# Patient Record
Sex: FEMALE | Race: OTHER | Employment: UNEMPLOYED | URBAN - NONMETROPOLITAN AREA
[De-identification: names, ages, dates, MRNs, and addresses within clinical notes are randomized per-mention and may not be internally consistent; named-entity substitution may affect disease eponyms.]

---

## 2021-11-10 ENCOUNTER — APPOINTMENT (OUTPATIENT)
Dept: ULTRASOUND IMAGING | Age: 46
End: 2021-11-10

## 2021-11-10 ENCOUNTER — HOSPITAL ENCOUNTER (EMERGENCY)
Age: 46
Discharge: HOME | End: 2021-11-10

## 2021-11-10 VITALS
DIASTOLIC BLOOD PRESSURE: 68 MMHG | OXYGEN SATURATION: 98 % | TEMPERATURE: 98.7 F | RESPIRATION RATE: 16 BRPM | SYSTOLIC BLOOD PRESSURE: 122 MMHG | HEART RATE: 62 BPM

## 2021-11-10 DIAGNOSIS — N93.8 DUB (DYSFUNCTIONAL UTERINE BLEEDING): Primary | ICD-10-CM

## 2021-11-10 DIAGNOSIS — D25.9 UTERINE LEIOMYOMA, UNSPECIFIED LOCATION: ICD-10-CM

## 2021-11-10 LAB
ALBUMIN SERPL-MCNC: 3.9 G/DL (ref 3.5–5.1)
ALP BLD-CCNC: 48 U/L (ref 38–126)
ALT SERPL-CCNC: 24 U/L (ref 11–66)
ANION GAP SERPL CALCULATED.3IONS-SCNC: 13 MEQ/L (ref 8–16)
AST SERPL-CCNC: 24 U/L (ref 5–40)
BASOPHILS # BLD: 0.9 %
BASOPHILS ABSOLUTE: 0.1 THOU/MM3 (ref 0–0.1)
BILIRUB SERPL-MCNC: < 0.2 MG/DL (ref 0.3–1.2)
BUN BLDV-MCNC: 11 MG/DL (ref 7–22)
CALCIUM SERPL-MCNC: 8.7 MG/DL (ref 8.5–10.5)
CHLORIDE BLD-SCNC: 105 MEQ/L (ref 98–111)
CO2: 22 MEQ/L (ref 23–33)
CREAT SERPL-MCNC: 0.8 MG/DL (ref 0.4–1.2)
EOSINOPHIL # BLD: 2.9 %
EOSINOPHILS ABSOLUTE: 0.2 THOU/MM3 (ref 0–0.4)
ERYTHROCYTE [DISTWIDTH] IN BLOOD BY AUTOMATED COUNT: 16.2 % (ref 11.5–14.5)
ERYTHROCYTE [DISTWIDTH] IN BLOOD BY AUTOMATED COUNT: 51.7 FL (ref 35–45)
GFR SERPL CREATININE-BSD FRML MDRD: 78 ML/MIN/1.73M2
GLUCOSE BLD-MCNC: 119 MG/DL (ref 70–108)
HCT VFR BLD CALC: 28.1 % (ref 37–47)
HEMOGLOBIN: 9.2 GM/DL (ref 12–16)
IMMATURE GRANS (ABS): 0.01 THOU/MM3 (ref 0–0.07)
IMMATURE GRANULOCYTES: 0.2 %
LYMPHOCYTES # BLD: 40.3 %
LYMPHOCYTES ABSOLUTE: 2.4 THOU/MM3 (ref 1–4.8)
MCH RBC QN AUTO: 28.6 PG (ref 26–33)
MCHC RBC AUTO-ENTMCNC: 32.7 GM/DL (ref 32.2–35.5)
MCV RBC AUTO: 87.3 FL (ref 81–99)
MONOCYTES # BLD: 9.4 %
MONOCYTES ABSOLUTE: 0.6 THOU/MM3 (ref 0.4–1.3)
NUCLEATED RED BLOOD CELLS: 0 /100 WBC
OSMOLALITY CALCULATION: 279.9 MOSMOL/KG (ref 275–300)
PLATELET # BLD: 413 THOU/MM3 (ref 130–400)
PMV BLD AUTO: 9.3 FL (ref 9.4–12.4)
POTASSIUM SERPL-SCNC: 3.6 MEQ/L (ref 3.5–5.2)
PREGNANCY, SERUM: NEGATIVE
RBC # BLD: 3.22 MILL/MM3 (ref 4.2–5.4)
SEG NEUTROPHILS: 46.3 %
SEGMENTED NEUTROPHILS ABSOLUTE COUNT: 2.7 THOU/MM3 (ref 1.8–7.7)
SODIUM BLD-SCNC: 140 MEQ/L (ref 135–145)
TOTAL PROTEIN: 7 G/DL (ref 6.1–8)
WBC # BLD: 5.9 THOU/MM3 (ref 4.8–10.8)

## 2021-11-10 PROCEDURE — 85025 COMPLETE CBC W/AUTO DIFF WBC: CPT

## 2021-11-10 PROCEDURE — 36415 COLL VENOUS BLD VENIPUNCTURE: CPT

## 2021-11-10 PROCEDURE — 84703 CHORIONIC GONADOTROPIN ASSAY: CPT

## 2021-11-10 PROCEDURE — 99284 EMERGENCY DEPT VISIT MOD MDM: CPT

## 2021-11-10 PROCEDURE — 76830 TRANSVAGINAL US NON-OB: CPT

## 2021-11-10 PROCEDURE — 80053 COMPREHEN METABOLIC PANEL: CPT

## 2021-11-10 RX ORDER — FERROUS SULFATE 325(65) MG
325 TABLET ORAL
Qty: 60 TABLET | Refills: 0 | Status: SHIPPED | OUTPATIENT
Start: 2021-11-10

## 2021-11-10 RX ORDER — MEDROXYPROGESTERONE ACETATE 10 MG/1
10 TABLET ORAL 2 TIMES DAILY
Qty: 28 TABLET | Refills: 0 | Status: SHIPPED | OUTPATIENT
Start: 2021-11-10 | End: 2021-11-24

## 2021-11-10 ASSESSMENT — ENCOUNTER SYMPTOMS
DIARRHEA: 0
CHEST TIGHTNESS: 0
ABDOMINAL PAIN: 0
SHORTNESS OF BREATH: 0
ABDOMINAL DISTENTION: 0
COLOR CHANGE: 0
VOMITING: 0
NAUSEA: 0

## 2021-11-10 NOTE — ED NOTES
Patient to ER. Patient has had her period for the last five days and it has been extremely heavy. Patient states she has been passing clots and has been feeling weak. Patient states she is changing her pads more frequently than normal. Patient speaks Bulgarian,  at bedside. Assessment complete. Family at bedside, call light in reach.       Minesh Campos  11/10/21 7893

## 2021-11-10 NOTE — ED NOTES
Pt resting in bed. Resp regular. Denies needs.  US in room to take pt for exam.      Viraj Andres RN  11/10/21 0571

## 2021-11-11 NOTE — ED NOTES
Bedside Shift report received from Veena Proctor, [facility]. Pt resting comfortable in bed.  Pt is alert and oriented, respirations are equal and unlabored, pt denies needs at this time, will continue to monitor        Heaven Winchester RN  11/10/21 6744

## 2021-11-11 NOTE — ED PROVIDER NOTES
[facility] Emergency Department    CHIEF COMPLAINT       Chief Complaint   Patient presents with    Vaginal Bleeding       Nurses Notes reviewed and I agree except as noted in the HPI. HISTORY OF PRESENT ILLNESS    Nanci Acuna is a 40 y.o. female who presents to the ED for evaluation of vaginal bleeding. Patient has vaginal bleeding for the last 5 days, notes she is passing some blood clots, she has minimal abdominal cramping. She notes she has felt lightheaded, had some weakness in her legs. She notes being pregnant 3 times in the past she has 2 living children. She notes she takes birth control from her home country. She denies any new sexual partners, denies any change in discharge, denies any change in urination. She denies any other acute symptoms at this time. She does not speak imgfave, history was obtained by electronic . HPI was provided by the patient. REVIEW OF SYSTEMS     Review of Systems   Constitutional: Negative for activity change, chills, fatigue and fever. Respiratory: Negative for chest tightness and shortness of breath. Cardiovascular: Negative for chest pain. Gastrointestinal: Negative for abdominal distention, abdominal pain, diarrhea, nausea and vomiting. Genitourinary: Positive for menstrual problem, pelvic pain and vaginal bleeding. Negative for difficulty urinating, dysuria and flank pain. Skin: Negative for color change and rash. Allergic/Immunologic: Negative for immunocompromised state. Neurological: Positive for dizziness and light-headedness. Negative for weakness and numbness. Hematological: Does not bruise/bleed easily. Psychiatric/Behavioral: Negative for agitation, behavioral problems and confusion. PAST MEDICAL HISTORY     Past Medical History:   Diagnosis Date    Hypertension        SURGICALHISTORY      has no past surgical history on file.     CURRENT MEDICATIONS       Discharge Medication List as of 11/10/2021 8:30 PM          ALLERGIES     has No Known Allergies. FAMILY HISTORY     has no family status information on file. family history is not on file. SOCIAL HISTORY       Social History     Socioeconomic History    Marital status: Single     Spouse name: Not on file    Number of children: Not on file    Years of education: Not on file    Highest education level: Not on file   Occupational History    Not on file   Tobacco Use    Smoking status: Not on file    Smokeless tobacco: Not on file   Substance and Sexual Activity    Alcohol use: Not on file    Drug use: Not on file    Sexual activity: Not on file   Other Topics Concern    Not on file   Social History Narrative    Not on file     Social Determinants of Health     Financial Resource Strain:     Difficulty of Paying Living Expenses: Not on file   Food Insecurity:     Worried About Running Out of Food in the Last Year: Not on file    Pranav of Food in the Last Year: Not on file   Transportation Needs:     Lack of Transportation (Medical): Not on file    Lack of Transportation (Non-Medical):  Not on file   Physical Activity:     Days of Exercise per Week: Not on file    Minutes of Exercise per Session: Not on file   Stress:     Feeling of Stress : Not on file   Social Connections:     Frequency of Communication with Friends and Family: Not on file    Frequency of Social Gatherings with Friends and Family: Not on file    Attends Oriental orthodox Services: Not on file    Active Member of Clubs or Organizations: Not on file    Attends Club or Organization Meetings: Not on file    Marital Status: Not on file   Intimate Partner Violence:     Fear of Current or Ex-Partner: Not on file    Emotionally Abused: Not on file    Physically Abused: Not on file    Sexually Abused: Not on file   Housing Stability:     Unable to Pay for Housing in the Last Year: Not on file    Number of [place] in the Last Year: Not on file    Unstable Housing in the Last Year: Not on file       PHYSICAL EXAM     INITIAL VITALS:  oral temperature is 98.7 °F (37.1 °C). Her blood pressure is 122/68 and her pulse is 62. Her respiration is 16 and oxygen saturation is 98%. Physical Exam  Vitals and nursing note reviewed. Constitutional:       Appearance: Normal appearance. She is well-developed. HENT:      Head: Normocephalic. Mouth/Throat:      Pharynx: Uvula midline. Eyes:      Conjunctiva/sclera: Conjunctivae normal.   Cardiovascular:      Rate and Rhythm: Normal rate and regular rhythm. Heart sounds: Normal heart sounds, S1 normal and S2 normal.   Pulmonary:      Effort: Pulmonary effort is normal. No respiratory distress. Breath sounds: Normal breath sounds. Chest:      Chest wall: No tenderness. Abdominal:      General: Bowel sounds are normal. There is no distension. Palpations: Abdomen is soft. Tenderness: There is no abdominal tenderness. Musculoskeletal:         General: Normal range of motion. Cervical back: Normal range of motion and neck supple. Lymphadenopathy:      Cervical: No cervical adenopathy. Skin:     General: Skin is warm and dry. Neurological:      Mental Status: She is alert and oriented to person, place, and time. Psychiatric:         Speech: Speech normal.         Behavior: Behavior normal.         Thought Content: Thought content normal.         DIFFERENTIAL DIAGNOSIS:   DUB, endometrial fibroid, ovarian cyst    DIAGNOSTIC RESULTS       RADIOLOGY: non-plainfilm images(s) such as CT, Ultrasound and MRI are read by the radiologist.  Plain radiographic images are visualized and preliminarily interpreted by the emergency physician unless otherwise stated below. US NON OB TRANSVAGINAL   Final Result   Polypoid mass in the fundus and extending within the endometrial canal as discussed above. **This report has been created using voice recognition software.   It may contain minor errors which are inherent in voice recognition technology. **      Final report electronically signed by Dr. Simon Richard on 11/10/2021 7:25 PM            LABS:   Labs Reviewed   CBC WITH AUTO DIFFERENTIAL - Abnormal; Notable for the following components:       Result Value    RBC 3.22 (*)     Hemoglobin 9.2 (*)     Hematocrit 28.1 (*)     RDW-CV 16.2 (*)     RDW-SD 51.7 (*)     Platelets 902 (*)     MPV 9.3 (*)     All other components within normal limits   COMPREHENSIVE METABOLIC PANEL - Abnormal; Notable for the following components:    Glucose 119 (*)     CO2 22 (*)     Total Bilirubin <0.2 (*)     All other components within normal limits   GLOMERULAR FILTRATION RATE, ESTIMATED - Abnormal; Notable for the following components:    Est, Glom Filt Rate 78 (*)     All other components within normal limits   HCG, SERUM, QUALITATIVE   ANION GAP   OSMOLALITY       EMERGENCY DEPARTMENT COURSE:   Vitals:    Vitals:    11/10/21 1704 11/10/21 1820 11/10/21 2042   BP: 128/75 122/67 122/68   Pulse: 64 65 62   Resp: 16 16 16   Temp: 98.7 °F (37.1 °C)     TempSrc: Oral     SpO2: 100% 99% 98%     MDM    Patient was seen and evaluated in the emergency department, patient appears to be in no acute distress, vital signs reviewed, no significant findings noted. Physical exam was completed, no significant abnormality noted. Lab work was obtained, slight anemia noted. No other acute findings noted. Ultrasound completed, 2.9 x 2.0 x 2.0 polyploid mass in the fundus and extending within the endometrial canal noted. Discussed case with Dr. Andre Szymanski, on-call OB/GYN, recommends treating with oral iron and Provera 10 mg twice daily. Follow-up with OB/GYN from his office. Discussed my findings and plan of care with the patient she is amenable with discharge. Advised to return to the ER with worsening signs and symptoms. She verbalized understanding. Medications - No data to display    Patient was seenindependently by myself.  The patient's final impression and disposition and plan was determined by myself. CRITICAL CARE:   None    CONSULTS:  OB/GYN  PROCEDURES:  None    FINAL IMPRESSION     1. DUB (dysfunctional uterine bleeding)    2. Uterine leiomyoma, unspecified location          DISPOSITION/PLAN   Discharged in stable condition  PATIENT REFERREDTO:  Melanie Tomlinson MD  [address] [facility]  [phone]    Call   For follow up and evaluation      DISCHARGE MEDICATIONS:  Discharge Medication List as of 11/10/2021  8:30 PM      START taking these medications    Details   medroxyPROGESTERone (PROVERA) 10 MG tablet Take 1 tablet by mouth 2 times daily for 14 days, Disp-28 tablet, R-0Print      ferrous sulfate (IRON 325) 325 (65 Fe) MG tablet Take 1 tablet by mouth daily (with breakfast), Disp-60 tablet, R-0Print             (Please note that portions of this note were completed with a voice recognition program.  Efforts were made to edit the dictations but occasionally words are mis-transcribed.)    Provider:  I personally performed the services described in the documentation,reviewed and edited the documentation which was dictated to the scribe in my presence, and it accurately records my words and actions.     El Shepherd CNP 11/10/21 9:18 PM    Goyo Shepherd, APRN - CNP        [employer], APRTOM - CNP  11/10/21 [id number]

## 2021-11-11 NOTE — ED NOTES
ED nurse-to-nurse bedside report    Chief Complaint   Patient presents with    Vaginal Bleeding      LOC: alert and orientated to name, place, date  Vital signs   Vitals:    11/10/21 1704 11/10/21 1820   BP: 128/75 122/67   Pulse: 64 65   Resp: 16 16   Temp: 98.7 °F (37.1 °C)    TempSrc: Oral    SpO2: 100% 99%      Pain:    Pain Interventions: none  Pain Goal:   Oxygen: No    Current needs required RA   Telemetry: No  LDAs:   Peripheral IV 11/10/21 Right Forearm (Active)   Site Assessment Clean; Dry; Intact 11/10/21 1823   Line Status Infusing 11/10/21 1823   Dressing Status Clean; Dry; Intact 11/10/21 1823     Continuous Infusions:   Mobility: Independent  Jc Fall Risk Score: No flowsheet data found. Fall Interventions: none  Report given to:  Marylu Diaz RN  11/10/21 4316